# Patient Record
Sex: FEMALE | Race: WHITE | Employment: UNEMPLOYED | URBAN - METROPOLITAN AREA
[De-identification: names, ages, dates, MRNs, and addresses within clinical notes are randomized per-mention and may not be internally consistent; named-entity substitution may affect disease eponyms.]

---

## 2017-03-02 ENCOUNTER — APPOINTMENT (OUTPATIENT)
Dept: GENERAL RADIOLOGY | Age: 52
End: 2017-03-02
Attending: FAMILY MEDICINE
Payer: COMMERCIAL

## 2017-03-02 ENCOUNTER — HOSPITAL ENCOUNTER (OUTPATIENT)
Age: 52
Discharge: HOME | End: 2017-03-02
Attending: FAMILY MEDICINE
Payer: COMMERCIAL

## 2017-03-02 VITALS
WEIGHT: 255 LBS | SYSTOLIC BLOOD PRESSURE: 146 MMHG | OXYGEN SATURATION: 100 % | DIASTOLIC BLOOD PRESSURE: 93 MMHG | RESPIRATION RATE: 18 BRPM | HEIGHT: 66 IN | TEMPERATURE: 98 F | HEART RATE: 76 BPM | BODY MASS INDEX: 40.98 KG/M2

## 2017-03-02 DIAGNOSIS — M72.2 PLANTAR FASCIITIS OF LEFT FOOT: Primary | ICD-10-CM

## 2017-03-02 PROCEDURE — 99213 OFFICE O/P EST LOW 20 MIN: CPT

## 2017-03-02 PROCEDURE — 73630 X-RAY EXAM OF FOOT: CPT

## 2017-03-02 NOTE — ED INITIAL ASSESSMENT (HPI)
The patient is here with complaints of a possible broken foot. She states last night she was helping her  put up a curtain ana when she stood on her tip toes and both heard and felt a pop to the bottom of the left foot, below the metatarsals.   She

## 2017-03-02 NOTE — ED PROVIDER NOTES
Patient Seen in: [facility]    History   Patient presents with:  Lower Extremity Injury (musculoskeletal)    Stated Complaint: possible broken left foot x1 day , foot pain    HPI    Patient is a 61-year-old female.   Coming BID       Family History   Problem Relation Age of Onset   • Stroke Father    • Cancer Mother    • Cancer Maternal Grandmother    • Stroke Paternal Grandmother    • Heart Disease Neg          Smoking Status: Current Every Day Smoker        Packs/Day: 0.00 along the mid bottom of the foot along the second metatarsal.  There is no obvious swelling, erythema or ecchymosis noted. No crepitus noted to the area. Full rotation of movement at the ankle and all toes.   Sensations normal, capillary refill less than

## 2017-04-19 ENCOUNTER — OFFICE VISIT (OUTPATIENT)
Dept: FAMILY MEDICINE CLINIC | Facility: CLINIC | Age: 52
End: 2017-04-19

## 2017-04-19 VITALS
BODY MASS INDEX: 41.15 KG/M2 | SYSTOLIC BLOOD PRESSURE: 132 MMHG | HEART RATE: 69 BPM | DIASTOLIC BLOOD PRESSURE: 80 MMHG | WEIGHT: 250 LBS | OXYGEN SATURATION: 99 % | TEMPERATURE: 98 F | HEIGHT: 65.5 IN

## 2017-04-19 DIAGNOSIS — J01.00 ACUTE NON-RECURRENT MAXILLARY SINUSITIS: Primary | ICD-10-CM

## 2017-04-19 PROCEDURE — 99213 OFFICE O/P EST LOW 20 MIN: CPT | Performed by: FAMILY MEDICINE

## 2017-04-19 RX ORDER — ERGOCALCIFEROL 1.25 MG/1
CAPSULE ORAL
Refills: 2 | COMMUNITY
Start: 2017-04-19

## 2017-04-19 RX ORDER — FLUTICASONE PROPIONATE 50 MCG
SPRAY, SUSPENSION (ML) NASAL
Refills: 11 | COMMUNITY
Start: 2017-02-16 | End: 2018-12-24

## 2017-04-19 RX ORDER — AMOXICILLIN AND CLAVULANATE POTASSIUM 875; 125 MG/1; MG/1
1 TABLET, FILM COATED ORAL 2 TIMES DAILY
Qty: 20 TABLET | Refills: 0 | Status: SHIPPED | OUTPATIENT
Start: 2017-04-19 | End: 2017-04-29

## 2017-04-19 NOTE — PATIENT INSTRUCTIONS
Take antibiotics with food and plenty of water. Eat yogurt or take probiotic daily. (Seymour Moffett is a good example of an OTC probiotic)  Make sure to finish the entire antibiotic treatment.   Increase fluids and rest.   Use otc meds as needed-- consider re-star

## 2017-04-20 NOTE — PROGRESS NOTES
CHIEF COMPLAINT:   Patient presents with:  Sinus Problem: x 3 days. yellow/green d/c. facial pain. chills this am. has bad allergies-- for last few weeks. then sx worsened. can't breathe through her nose at night. using saline nasal sprays.        HPI:   Kim Morris TABS 1 TAB BID Disp: 60 Rfl: 0      Past Medical History   Diagnosis Date   • Anxiety state, unspecified    • Depression    • Back pain    • Esophageal reflux       History reviewed. No pertinent past surgical history.    Family History   Problem Relation A Visit:  Signed Prescriptions Disp Refills    Amoxicillin-Pot Clavulanate 875-125 MG Oral Tab 20 tablet 0      Sig: Take 1 tablet by mouth 2 (two) times daily. Risks, benefits, side effects of medication addressed and explained.     Patient Ins

## 2017-07-10 ENCOUNTER — OFFICE VISIT (OUTPATIENT)
Dept: FAMILY MEDICINE CLINIC | Facility: CLINIC | Age: 52
End: 2017-07-10

## 2017-07-10 VITALS
SYSTOLIC BLOOD PRESSURE: 142 MMHG | WEIGHT: 250 LBS | TEMPERATURE: 98 F | HEART RATE: 83 BPM | OXYGEN SATURATION: 98 % | BODY MASS INDEX: 41 KG/M2 | DIASTOLIC BLOOD PRESSURE: 82 MMHG

## 2017-07-10 DIAGNOSIS — H00.015 HORDEOLUM EXTERNUM OF LEFT LOWER EYELID: Primary | ICD-10-CM

## 2017-07-10 PROCEDURE — 99213 OFFICE O/P EST LOW 20 MIN: CPT | Performed by: FAMILY MEDICINE

## 2017-07-10 RX ORDER — AMOXICILLIN AND CLAVULANATE POTASSIUM 875; 125 MG/1; MG/1
TABLET, FILM COATED ORAL
Refills: 0 | COMMUNITY
Start: 2017-05-26 | End: 2017-06-02

## 2017-07-10 RX ORDER — TOBRAMYCIN 3 MG/ML
2 SOLUTION/ DROPS OPHTHALMIC EVERY 4 HOURS
Qty: 5 ML | Refills: 0 | Status: SHIPPED | OUTPATIENT
Start: 2017-07-10 | End: 2017-07-17

## 2017-07-10 RX ORDER — AZELASTINE HCL 205.5 UG/1
SPRAY NASAL
Refills: 12 | COMMUNITY
Start: 2017-05-02 | End: 2018-12-24

## 2017-07-11 NOTE — PATIENT INSTRUCTIONS
Use tobrex drops-- 2 drops in affected eye every 4 hours for 5-7 days. Continue warm compresses to eye. If no better in 2-3 days, follow-up with optometry or ophthalmology.

## 2017-07-11 NOTE — PROGRESS NOTES
CHIEF COMPLAINT:   Patient presents with:  Eye Problem: stye in left eye. painful. slowly getting worse. HPI:   Mayda Tucker is a 50year old female who presents with chief complaint of stye in left eye. Symptoms began  3  days ago.  Symptoms ha reviewed. No pertinent surgical history.    Family History   Problem Relation Age of Onset   • Stroke Father    • Cancer Mother    • Cancer Maternal Grandmother    • Stroke Paternal Grandmother    • Heart Disease Neg       Smoking status: Current Every Day optometry or ophthalmology.

## 2017-08-09 ENCOUNTER — OFFICE VISIT (OUTPATIENT)
Dept: FAMILY MEDICINE CLINIC | Facility: CLINIC | Age: 52
End: 2017-08-09

## 2017-08-09 VITALS
HEART RATE: 84 BPM | SYSTOLIC BLOOD PRESSURE: 128 MMHG | OXYGEN SATURATION: 96 % | DIASTOLIC BLOOD PRESSURE: 78 MMHG | WEIGHT: 155 LBS | HEIGHT: 65.5 IN | RESPIRATION RATE: 18 BRPM | BODY MASS INDEX: 25.52 KG/M2 | TEMPERATURE: 98 F

## 2017-08-09 DIAGNOSIS — R35.0 FREQUENCY OF URINATION: ICD-10-CM

## 2017-08-09 DIAGNOSIS — N30.00 ACUTE CYSTITIS WITHOUT HEMATURIA: Primary | ICD-10-CM

## 2017-08-09 LAB
APPEARANCE: CLEAR
MULTISTIX LOT#: NORMAL NUMERIC
PH, URINE: 6 (ref 4.5–8)
SPECIFIC GRAVITY: 1.02 (ref 1–1.03)
URINE-COLOR: YELLOW
UROBILINOGEN,SEMI-QN: 0.2 MG/DL (ref 0–1.9)

## 2017-08-09 PROCEDURE — 99213 OFFICE O/P EST LOW 20 MIN: CPT | Performed by: FAMILY MEDICINE

## 2017-08-09 PROCEDURE — 87086 URINE CULTURE/COLONY COUNT: CPT | Performed by: FAMILY MEDICINE

## 2017-08-09 PROCEDURE — 81003 URINALYSIS AUTO W/O SCOPE: CPT | Performed by: FAMILY MEDICINE

## 2017-08-09 RX ORDER — CIPROFLOXACIN 500 MG/1
500 TABLET, FILM COATED ORAL 2 TIMES DAILY
Qty: 10 TABLET | Refills: 0 | Status: SHIPPED | OUTPATIENT
Start: 2017-08-09 | End: 2017-08-14

## 2017-08-10 NOTE — PROGRESS NOTES
León Garcia is a 50year old female. HPI:   Patient presents with symptoms of UTI for 2-3 days. Complaining of urinary frequency, urgency, dysuria, mild suprapubic pain, low back pain, but no flank pain. Denies fever, hematuria.   Pt has history fever/chills or fatigue  SKIN: denies any unusual skin lesions or rashes  RESPIRATORY: no shortness of breath with exertion  CARDIOVASCULAR: denies chest pain on exertion and palpitations. GI: no nausea or vomiting  : as above.   NEURO: denies headaches finish the entire antibiotic treatment. We will send the urine specimen for culture and call you in 2-3 days with results  Increase fluid intake and bladder emptying. Return in 3 days if not better. Call if fever, vomiting, worsening symptoms.       The p

## 2017-08-10 NOTE — PATIENT INSTRUCTIONS
Take antibiotics with food and plenty of water. Eat yogurt daily or use probiotics. (Dunia Estrada is a good example of an OTC probiotic)  Make sure to finish the entire antibiotic treatment.   We will send the urine specimen for culture and call you in 2-3 days w

## 2017-08-12 NOTE — PROGRESS NOTES
Called pt on 8/11/ to notify of lab results-- put in lab book, but forgot to document in EPIC. Detailed msg left per pt consent.  Pt to call back or follow-up if symptoms increase or persist.

## 2017-11-06 ENCOUNTER — OFFICE VISIT (OUTPATIENT)
Dept: FAMILY MEDICINE CLINIC | Facility: CLINIC | Age: 52
End: 2017-11-06

## 2017-11-06 VITALS
HEIGHT: 66 IN | DIASTOLIC BLOOD PRESSURE: 88 MMHG | WEIGHT: 252.19 LBS | HEART RATE: 86 BPM | TEMPERATURE: 98 F | BODY MASS INDEX: 40.53 KG/M2 | RESPIRATION RATE: 18 BRPM | OXYGEN SATURATION: 99 % | SYSTOLIC BLOOD PRESSURE: 136 MMHG

## 2017-11-06 DIAGNOSIS — H00.011 HORDEOLUM EXTERNUM OF RIGHT UPPER EYELID: Primary | ICD-10-CM

## 2017-11-06 PROCEDURE — 99213 OFFICE O/P EST LOW 20 MIN: CPT | Performed by: FAMILY MEDICINE

## 2017-11-06 RX ORDER — TOBRAMYCIN 3 MG/ML
2 SOLUTION/ DROPS OPHTHALMIC EVERY 4 HOURS
Qty: 5 ML | Refills: 0 | Status: SHIPPED | OUTPATIENT
Start: 2017-11-06 | End: 2017-11-13

## 2017-11-06 NOTE — PATIENT INSTRUCTIONS
Use tobrex drops-- 2 drops in affected eye every 4 hours for 5-7 days. Use for 3 days past the first clear day. Warm compresses to area to help reduce swelling and discomfort.    If no better in 2-3 days or if symptoms worsen, follow-up for further evalua

## 2017-11-06 NOTE — PROGRESS NOTES
CHIEF COMPLAINT:   Patient presents with: Eye Visual Problem (opthalmic): stye on right eye x2 days (warm compresses used at home)      HPI:   Garrison Wilkins is a 50year old female who presents with chief complaint of right eye stye.   Symptoms began History reviewed. No pertinent surgical history.    Family History   Problem Relation Age of Onset   • Stroke Father    • Cancer Mother    • Cancer Maternal Grandmother    • Stroke Paternal Grandmother    • Heart Disease Neg       Smoking status: Current to help reduce swelling and discomfort. If no better in 2-3 days or if symptoms worsen, follow-up for further evaluation.

## 2017-11-08 ENCOUNTER — TELEPHONE (OUTPATIENT)
Dept: FAMILY MEDICINE CLINIC | Facility: CLINIC | Age: 52
End: 2017-11-08

## 2017-11-08 RX ORDER — AMOXICILLIN AND CLAVULANATE POTASSIUM 875; 125 MG/1; MG/1
1 TABLET, FILM COATED ORAL 2 TIMES DAILY
Qty: 14 TABLET | Refills: 0 | Status: SHIPPED | OUTPATIENT
Start: 2017-11-08 | End: 2017-11-15

## 2017-11-09 NOTE — TELEPHONE ENCOUNTER
Spoke to patient who was seen 2 days ago in [facility] [address] and prescribed Tobrex drops for stye to upper eyelid. Patient states that she poked it with a needle to relieve pressure and pus. Still having swelling and redness to area and tenderness.  Will start on Aug

## 2018-02-02 ENCOUNTER — OFFICE VISIT (OUTPATIENT)
Dept: FAMILY MEDICINE CLINIC | Facility: CLINIC | Age: 53
End: 2018-02-02

## 2018-02-02 VITALS
SYSTOLIC BLOOD PRESSURE: 138 MMHG | OXYGEN SATURATION: 96 % | WEIGHT: 248 LBS | HEART RATE: 79 BPM | TEMPERATURE: 98 F | BODY MASS INDEX: 39.86 KG/M2 | HEIGHT: 66 IN | DIASTOLIC BLOOD PRESSURE: 78 MMHG

## 2018-02-02 DIAGNOSIS — H00.015 HORDEOLUM EXTERNUM OF LEFT LOWER EYELID: Primary | ICD-10-CM

## 2018-02-02 PROCEDURE — 99213 OFFICE O/P EST LOW 20 MIN: CPT | Performed by: FAMILY MEDICINE

## 2018-02-02 RX ORDER — AMOXICILLIN AND CLAVULANATE POTASSIUM 875; 125 MG/1; MG/1
1 TABLET, FILM COATED ORAL 2 TIMES DAILY
Qty: 14 TABLET | Refills: 0 | Status: SHIPPED | OUTPATIENT
Start: 2018-02-02 | End: 2018-02-09

## 2018-02-02 NOTE — PATIENT INSTRUCTIONS
Take antibiotics with food and plenty of water. Eat yogurt or take probiotic daily. (Josiane Limon is a good example of an OTC probiotic)  Make sure to finish the entire antibiotic treatment. Continue warm compresses and tobrex drops until symptoms resolve.

## 2018-02-03 NOTE — PROGRESS NOTES
CHIEF COMPLAINT:   Patient presents with:   Eye Visual Problem (opthalmic): pt states she has a stye in left eye, c/o pain, lower eyelid, has swelling and redness in area x 3 dys       HPI:   Guzman Duque is a 50year old female who presents with chief 0   Cyclobenzaprine HCl (CYCLOBENZAPRINE) 10 MG Oral Tab Take 10 mg by mouth 3 (three) times daily as needed for Muscle spasms. Disp:  Rfl:      No current facility-administered medications for this visit.     Past Medical History:   Diagnosis Date   • Anxi diagnosis)    PLAN: Hygeine and comfort care as listen in patient instructions.   Medication as listed below       Signed Prescriptions Disp Refills    Amoxicillin-Pot Clavulanate 875-125 MG Oral Tab 14 tablet 0      Sig: Take 1 tablet by mouth 2 (two) time

## 2018-03-16 ENCOUNTER — HOSPITAL ENCOUNTER (OUTPATIENT)
Age: 53
Discharge: HOME | End: 2018-03-16
Attending: EMERGENCY MEDICINE
Payer: COMMERCIAL

## 2018-03-16 VITALS
OXYGEN SATURATION: 96 % | BODY MASS INDEX: 40 KG/M2 | DIASTOLIC BLOOD PRESSURE: 94 MMHG | RESPIRATION RATE: 22 BRPM | TEMPERATURE: 99 F | HEART RATE: 96 BPM | WEIGHT: 246.94 LBS | SYSTOLIC BLOOD PRESSURE: 145 MMHG

## 2018-03-16 DIAGNOSIS — J01.90 ACUTE NON-RECURRENT SINUSITIS, UNSPECIFIED LOCATION: Primary | ICD-10-CM

## 2018-03-16 PROCEDURE — 99214 OFFICE O/P EST MOD 30 MIN: CPT

## 2018-03-16 PROCEDURE — 99213 OFFICE O/P EST LOW 20 MIN: CPT

## 2018-03-16 RX ORDER — AMOXICILLIN AND CLAVULANATE POTASSIUM 875; 125 MG/1; MG/1
1 TABLET, FILM COATED ORAL 2 TIMES DAILY
Qty: 20 TABLET | Refills: 0 | Status: SHIPPED | OUTPATIENT
Start: 2018-03-16 | End: 2018-03-26

## 2018-03-17 NOTE — ED PROVIDER NOTES
Patient Seen in: [facility]    History   Patient presents with:  Cough/URI    Stated Complaint: head cold x1 week    HPI    65-year-old female presents to the emergency department complaining of head congestion for 1 week w rashes or lesions. Extremities are atraumatic. Neuro exam: Awake and moving all 4 extremities well. Exam findings and treatment plan were discussed prior to discharge.     ED Course   Labs Reviewed - No data to display    ED Course as of Mar 16 2020  ---

## 2018-04-27 ENCOUNTER — TELEPHONE (OUTPATIENT)
Dept: FAMILY MEDICINE CLINIC | Facility: CLINIC | Age: 53
End: 2018-04-27

## 2018-04-27 NOTE — TELEPHONE ENCOUNTER
This is a new Pt. Pt was trying to make an appt. With Dr. Tammie Ashford, told her that Dr. Tammie Ashford does not take new Pts. She said her acupuncturist Alma Chandra referred her, and Dr. Tammie Ashford should be aware of her case.   Please advise if its ok to

## 2018-04-30 NOTE — TELEPHONE ENCOUNTER
Spoke with patient informed her Dr Adolph Kennedy is not excepting new patients at this time. Told her we do have Dr Juan José Feliz is taking new patients if she wanted to.

## 2018-06-21 ENCOUNTER — HOSPITAL ENCOUNTER (OUTPATIENT)
Dept: MRI IMAGING | Age: 53
Discharge: HOME | End: 2018-06-21
Attending: PHYSICAL MEDICINE & REHABILITATION
Payer: COMMERCIAL

## 2018-06-21 DIAGNOSIS — M54.50 LUMBAGO: ICD-10-CM

## 2018-06-21 PROCEDURE — 72148 MRI LUMBAR SPINE W/O DYE: CPT | Performed by: PHYSICAL MEDICINE & REHABILITATION

## 2018-12-24 ENCOUNTER — OFFICE VISIT (OUTPATIENT)
Dept: FAMILY MEDICINE CLINIC | Facility: CLINIC | Age: 53
End: 2018-12-24
Payer: COMMERCIAL

## 2018-12-24 VITALS
RESPIRATION RATE: 20 BRPM | SYSTOLIC BLOOD PRESSURE: 137 MMHG | WEIGHT: 250 LBS | HEART RATE: 78 BPM | OXYGEN SATURATION: 96 % | BODY MASS INDEX: 40 KG/M2 | DIASTOLIC BLOOD PRESSURE: 87 MMHG | TEMPERATURE: 99 F

## 2018-12-24 DIAGNOSIS — R35.0 FREQUENT URINATION: Primary | ICD-10-CM

## 2018-12-24 DIAGNOSIS — N30.00 ACUTE CYSTITIS WITHOUT HEMATURIA: ICD-10-CM

## 2018-12-24 PROCEDURE — 99213 OFFICE O/P EST LOW 20 MIN: CPT | Performed by: FAMILY MEDICINE

## 2018-12-24 RX ORDER — NITROFURANTOIN 25; 75 MG/1; MG/1
100 CAPSULE ORAL 2 TIMES DAILY
Qty: 14 CAPSULE | Refills: 0 | Status: SHIPPED | OUTPATIENT
Start: 2018-12-24 | End: 2018-12-31

## 2018-12-24 NOTE — PROGRESS NOTES
Yoly Conley is a 48year old female. HPI:   Patient presents with symptoms of UTI for 7 days. Complaining of urinary frequency, urgency, dysuria, + suprapubic pain. Denies back pain, fever, hematuria.   Pt has history of UTI in past.      Current headaches or dizziness.     EXAM:   /87 (BP Location: Right arm, Patient Position: Sitting, Cuff Size: large)   Pulse 78   Temp 98.8 °F (37.1 °C) (Oral)   Resp 20   Wt 250 lb   LMP 02/19/2014   SpO2 96%   BMI 40.35 kg/m²   GENERAL: well developed, wel indicates understanding of these issues and agrees to the plan.

## 2018-12-24 NOTE — PATIENT INSTRUCTIONS
Take antibiotics with food and plenty of water. Eat yogurt daily or use probiotics. (Aldo Andrzej is a good example of an OTC probiotic)  Make sure to finish the entire antibiotic treatment. Increase fluid intake and bladder emptying.   Return in 3 days if not

## 2018-12-26 ENCOUNTER — TELEPHONE (OUTPATIENT)
Dept: FAMILY MEDICINE CLINIC | Facility: CLINIC | Age: 53
End: 2018-12-26

## 2018-12-26 RX ORDER — AMOXICILLIN AND CLAVULANATE POTASSIUM 875; 125 MG/1; MG/1
1 TABLET, FILM COATED ORAL 2 TIMES DAILY
Qty: 20 TABLET | Refills: 0 | Status: SHIPPED | OUTPATIENT
Start: 2018-12-26 | End: 2019-01-05

## 2019-06-22 ENCOUNTER — OFFICE VISIT (OUTPATIENT)
Dept: FAMILY MEDICINE CLINIC | Facility: CLINIC | Age: 54
End: 2019-06-22
Payer: COMMERCIAL

## 2019-06-22 VITALS — TEMPERATURE: 99 F | OXYGEN SATURATION: 96 % | HEART RATE: 75 BPM

## 2019-06-22 DIAGNOSIS — N30.00 ACUTE CYSTITIS WITHOUT HEMATURIA: ICD-10-CM

## 2019-06-22 DIAGNOSIS — R39.9 URINARY SYMPTOM OR SIGN: Primary | ICD-10-CM

## 2019-06-22 PROCEDURE — 81003 URINALYSIS AUTO W/O SCOPE: CPT | Performed by: FAMILY MEDICINE

## 2019-06-22 PROCEDURE — 99213 OFFICE O/P EST LOW 20 MIN: CPT | Performed by: FAMILY MEDICINE

## 2019-06-22 PROCEDURE — 87086 URINE CULTURE/COLONY COUNT: CPT | Performed by: FAMILY MEDICINE

## 2019-06-22 RX ORDER — AMOXICILLIN AND CLAVULANATE POTASSIUM 875; 125 MG/1; MG/1
1 TABLET, FILM COATED ORAL 2 TIMES DAILY
Qty: 20 TABLET | Refills: 0 | Status: SHIPPED | OUTPATIENT
Start: 2019-06-22 | End: 2019-07-02

## 2019-06-22 NOTE — PATIENT INSTRUCTIONS
Take antibiotics with food and plenty of water. Eat yogurt daily or use probiotics. ([facility] is a good example of an OTC probiotic)  Make sure to finish the entire antibiotic treatment.   We will send the urine specimen for culture and call you in 2-3 days w

## 2019-06-22 NOTE — PROGRESS NOTES
Anali Spencer is a 48year old female. HPI:   Patient presents with symptoms of UTI for 7 days. Complaining of urinary frequency, urgency, dysuria, mild suprapubic pain, low back pain. Denies flank pain, fever, hematuria.   Pt reports frequent histo rashes  RESPIRATORY: no shortness of breath with exertion  CARDIOVASCULAR: denies chest pain on exertion and palpitations. GI: no nausea or vomiting  : as above. NEURO: denies headaches or dizziness.     EXAM:   Pulse 75   Temp 98.6 °F (37 °C) (Oral) antibiotic treatment. We will send the urine specimen for culture and call you in 2-3 days with results  You may take otc pyridium for urinary discomfort if needed. Increase fluid intake and bladder emptying. Return in 3 days if not better.  Call if fev

## 2019-10-17 ENCOUNTER — OFFICE VISIT (OUTPATIENT)
Dept: FAMILY MEDICINE CLINIC | Facility: CLINIC | Age: 54
End: 2019-10-17
Payer: COMMERCIAL

## 2019-10-17 VITALS
DIASTOLIC BLOOD PRESSURE: 80 MMHG | HEIGHT: 65.5 IN | OXYGEN SATURATION: 98 % | WEIGHT: 228 LBS | RESPIRATION RATE: 16 BRPM | SYSTOLIC BLOOD PRESSURE: 128 MMHG | HEART RATE: 88 BPM | TEMPERATURE: 99 F | BODY MASS INDEX: 37.53 KG/M2

## 2019-10-17 DIAGNOSIS — H00.011 HORDEOLUM EXTERNUM OF RIGHT UPPER EYELID: Primary | ICD-10-CM

## 2019-10-17 PROCEDURE — 99213 OFFICE O/P EST LOW 20 MIN: CPT | Performed by: PHYSICIAN ASSISTANT

## 2019-10-17 RX ORDER — TOBRAMYCIN 3 MG/ML
1 SOLUTION/ DROPS OPHTHALMIC EVERY 4 HOURS
Qty: 1 BOTTLE | Refills: 0 | Status: SHIPPED | OUTPATIENT
Start: 2019-10-17 | End: 2019-10-24

## 2019-10-17 NOTE — PROGRESS NOTES
CHIEF COMPLAINT:   Patient presents with:   Eye Visual Problem (opthalmic): stye in right eye x 3 days,       HPI:   Beatriz Donohue is a 48year old female who presents for complaints of stye on right lower eyelid Symptoms started 3 days ago and have per status: Current Every Day Smoker      Smokeless tobacco: Never Used    Alcohol use: Not on file    Drug use: Not on file        REVIEW OF SYSTEMS:   GENERAL: no fatigue  SKIN: no rashes or abnormal skin lesions  HEENT: See HPI.     LUNGS: denies shortness o Hygeine and comfort care as listed in patient instructions. Advised patient to avoid touching eyes. Stressed importance of good handwashing. Warm compresses to affected eye prn.  Patient states that he would prefer eye drops over ointment,as tobrex has wo

## 2019-10-17 NOTE — PATIENT INSTRUCTIONS
Sty (or Stye)  A sty is an infection of the oil gland of the eyelid. It may develop into a small pocket of pus (an abscess). This can cause pain, redness, and swelling.  In early stages, a sty is treated with antibiotic cream, eye drops, or a small towel © 8694-5160 [employer] [address]. [address] [facility], [facility] [place]. All rights reserved. This information is not intended as a substitute for professional medical care. Always follow your healthcare professional's instructions.

## 2022-05-01 ENCOUNTER — HOSPITAL ENCOUNTER (OUTPATIENT)
Age: 57
Discharge: HOME | End: 2022-05-01
Payer: COMMERCIAL

## 2022-05-01 VITALS
DIASTOLIC BLOOD PRESSURE: 89 MMHG | WEIGHT: 220 LBS | OXYGEN SATURATION: 97 % | RESPIRATION RATE: 16 BRPM | TEMPERATURE: 98 F | SYSTOLIC BLOOD PRESSURE: 139 MMHG | HEART RATE: 72 BPM | HEIGHT: 66 IN | BODY MASS INDEX: 35.36 KG/M2

## 2022-05-01 DIAGNOSIS — J01.41 ACUTE RECURRENT PANSINUSITIS: Primary | ICD-10-CM

## 2022-05-01 RX ORDER — AMOXICILLIN AND CLAVULANATE POTASSIUM 875; 125 MG/1; MG/1
1 TABLET, FILM COATED ORAL 2 TIMES DAILY
Qty: 20 TABLET | Refills: 0 | Status: SHIPPED | OUTPATIENT
Start: 2022-05-01 | End: 2022-05-11

## 2022-05-01 NOTE — ED INITIAL ASSESSMENT (HPI)
Patient reports feeling like she might have sinus infection, symptoms x 1 week. Reports congestion, sinus pressure, drainage. Denies fever. No cough. No sore throat.

## 2022-08-10 ENCOUNTER — HOSPITAL ENCOUNTER (OUTPATIENT)
Age: 57
Discharge: HOME | End: 2022-08-10
Payer: COMMERCIAL

## 2022-08-10 VITALS
SYSTOLIC BLOOD PRESSURE: 140 MMHG | DIASTOLIC BLOOD PRESSURE: 83 MMHG | WEIGHT: 240 LBS | HEART RATE: 68 BPM | OXYGEN SATURATION: 99 % | TEMPERATURE: 97 F | RESPIRATION RATE: 20 BRPM | BODY MASS INDEX: 38.57 KG/M2 | HEIGHT: 66 IN

## 2022-08-10 DIAGNOSIS — S39.012A STRAIN OF LUMBAR REGION, INITIAL ENCOUNTER: Primary | ICD-10-CM

## 2022-08-10 PROCEDURE — 99213 OFFICE O/P EST LOW 20 MIN: CPT | Performed by: NURSE PRACTITIONER

## 2022-08-10 RX ORDER — PREDNISONE 20 MG/1
40 TABLET ORAL DAILY
Qty: 10 TABLET | Refills: 0 | Status: SHIPPED | OUTPATIENT
Start: 2022-08-10 | End: 2022-08-15

## 2022-08-10 NOTE — ED INITIAL ASSESSMENT (HPI)
Pt c/o lower back pain since Thursday, saw Chiropractor 3x since Thursday for adjustments and today when Pt was at office her Chiropractor advised she come to [address] [place] [address] for steroids

## 2022-08-12 ENCOUNTER — HOSPITAL ENCOUNTER (EMERGENCY)
Facility: HOSPITAL | Age: 57
Discharge: HOME | End: 2022-08-12
Attending: EMERGENCY MEDICINE
Payer: COMMERCIAL

## 2022-08-12 ENCOUNTER — APPOINTMENT (OUTPATIENT)
Dept: GENERAL RADIOLOGY | Facility: HOSPITAL | Age: 57
End: 2022-08-12
Attending: EMERGENCY MEDICINE
Payer: COMMERCIAL

## 2022-08-12 VITALS
BODY MASS INDEX: 38.57 KG/M2 | TEMPERATURE: 98 F | OXYGEN SATURATION: 97 % | WEIGHT: 240 LBS | DIASTOLIC BLOOD PRESSURE: 62 MMHG | HEIGHT: 66 IN | HEART RATE: 72 BPM | SYSTOLIC BLOOD PRESSURE: 105 MMHG | RESPIRATION RATE: 16 BRPM

## 2022-08-12 DIAGNOSIS — S39.012A STRAIN OF LUMBAR REGION, INITIAL ENCOUNTER: Primary | ICD-10-CM

## 2022-08-12 PROCEDURE — 72110 X-RAY EXAM L-2 SPINE 4/>VWS: CPT | Performed by: EMERGENCY MEDICINE

## 2022-08-12 PROCEDURE — 99283 EMERGENCY DEPT VISIT LOW MDM: CPT

## 2022-08-12 NOTE — ED INITIAL ASSESSMENT (HPI)
Lower back pain since 1 week . History of back injury 1 week ago after she was catching the car door. patient is ambulatory with steady gait.

## 2022-09-12 ENCOUNTER — HOSPITAL ENCOUNTER (OUTPATIENT)
Dept: GENERAL RADIOLOGY | Facility: HOSPITAL | Age: 57
Discharge: HOME | End: 2022-09-12
Attending: FAMILY MEDICINE
Payer: COMMERCIAL

## 2022-09-12 DIAGNOSIS — R07.81 RIB PAIN: ICD-10-CM

## 2022-09-12 PROCEDURE — 71101 X-RAY EXAM UNILAT RIBS/CHEST: CPT | Performed by: FAMILY MEDICINE

## 2022-11-06 ENCOUNTER — HOSPITAL ENCOUNTER (EMERGENCY)
Facility: HOSPITAL | Age: 57
Discharge: HOME | End: 2022-11-06
Attending: EMERGENCY MEDICINE
Payer: COMMERCIAL

## 2022-11-06 ENCOUNTER — APPOINTMENT (OUTPATIENT)
Dept: GENERAL RADIOLOGY | Facility: HOSPITAL | Age: 57
End: 2022-11-06
Attending: EMERGENCY MEDICINE
Payer: COMMERCIAL

## 2022-11-06 ENCOUNTER — APPOINTMENT (OUTPATIENT)
Dept: ULTRASOUND IMAGING | Facility: HOSPITAL | Age: 57
End: 2022-11-06
Attending: EMERGENCY MEDICINE
Payer: COMMERCIAL

## 2022-11-06 VITALS
OXYGEN SATURATION: 99 % | HEART RATE: 66 BPM | WEIGHT: 220 LBS | BODY MASS INDEX: 35.36 KG/M2 | DIASTOLIC BLOOD PRESSURE: 85 MMHG | TEMPERATURE: 98 F | HEIGHT: 66.14 IN | RESPIRATION RATE: 20 BRPM | SYSTOLIC BLOOD PRESSURE: 162 MMHG

## 2022-11-06 DIAGNOSIS — K80.20 CALCULUS OF GALLBLADDER WITHOUT CHOLECYSTITIS WITHOUT OBSTRUCTION: Primary | ICD-10-CM

## 2022-11-06 LAB
ALBUMIN SERPL-MCNC: 3.8 G/DL (ref 3.4–5)
ALBUMIN/GLOB SERPL: 0.8 {RATIO} (ref 1–2)
ALP LIVER SERPL-CCNC: 96 U/L
ALT SERPL-CCNC: 40 U/L
ANION GAP SERPL CALC-SCNC: 8 MMOL/L (ref 0–18)
AST SERPL-CCNC: 17 U/L (ref 15–37)
BASOPHILS # BLD AUTO: 0.05 X10(3) UL (ref 0–0.2)
BASOPHILS NFR BLD AUTO: 0.4 %
BILIRUB SERPL-MCNC: 0.3 MG/DL (ref 0.1–2)
BILIRUB UR QL STRIP.AUTO: NEGATIVE
BUN BLD-MCNC: 11 MG/DL (ref 7–18)
CALCIUM BLD-MCNC: 9.5 MG/DL (ref 8.5–10.1)
CHLORIDE SERPL-SCNC: 103 MMOL/L (ref 98–112)
CLARITY UR REFRACT.AUTO: CLEAR
CO2 SERPL-SCNC: 25 MMOL/L (ref 21–32)
COLOR UR AUTO: YELLOW
CREAT BLD-MCNC: 0.77 MG/DL
EOSINOPHIL # BLD AUTO: 0.17 X10(3) UL (ref 0–0.7)
EOSINOPHIL NFR BLD AUTO: 1.5 %
ERYTHROCYTE [DISTWIDTH] IN BLOOD BY AUTOMATED COUNT: 15.3 %
GFR SERPLBLD BASED ON 1.73 SQ M-ARVRAT: 91 ML/MIN/1.73M2 (ref 60–?)
GLOBULIN PLAS-MCNC: 4.5 G/DL (ref 2.8–4.4)
GLUCOSE BLD-MCNC: 119 MG/DL (ref 70–99)
GLUCOSE UR STRIP.AUTO-MCNC: NEGATIVE MG/DL
HCT VFR BLD AUTO: 44.2 %
HGB BLD-MCNC: 14.2 G/DL
IMM GRANULOCYTES # BLD AUTO: 0.04 X10(3) UL (ref 0–1)
IMM GRANULOCYTES NFR BLD: 0.3 %
KETONES UR STRIP.AUTO-MCNC: NEGATIVE MG/DL
LIPASE SERPL-CCNC: 135 U/L (ref 73–393)
LYMPHOCYTES # BLD AUTO: 2.8 X10(3) UL (ref 1–4)
LYMPHOCYTES NFR BLD AUTO: 24.5 %
MCH RBC QN AUTO: 26.6 PG (ref 26–34)
MCHC RBC AUTO-ENTMCNC: 32.1 G/DL (ref 31–37)
MCV RBC AUTO: 82.8 FL
MONOCYTES # BLD AUTO: 1.02 X10(3) UL (ref 0.1–1)
MONOCYTES NFR BLD AUTO: 8.9 %
NEUTROPHILS # BLD AUTO: 7.37 X10 (3) UL (ref 1.5–7.7)
NEUTROPHILS # BLD AUTO: 7.37 X10(3) UL (ref 1.5–7.7)
NEUTROPHILS NFR BLD AUTO: 64.4 %
NITRITE UR QL STRIP.AUTO: NEGATIVE
OSMOLALITY SERPL CALC.SUM OF ELEC: 283 MOSM/KG (ref 275–295)
PH UR STRIP.AUTO: 8 [PH] (ref 5–8)
PLATELET # BLD AUTO: 448 10(3)UL (ref 150–450)
POTASSIUM SERPL-SCNC: 3.1 MMOL/L (ref 3.5–5.1)
PROT SERPL-MCNC: 8.3 G/DL (ref 6.4–8.2)
PROT UR STRIP.AUTO-MCNC: NEGATIVE MG/DL
RBC # BLD AUTO: 5.34 X10(6)UL
RBC UR QL AUTO: NEGATIVE
SARS-COV-2 RNA RESP QL NAA+PROBE: NOT DETECTED
SODIUM SERPL-SCNC: 136 MMOL/L (ref 136–145)
SP GR UR STRIP.AUTO: 1.01 (ref 1–1.03)
UROBILINOGEN UR STRIP.AUTO-MCNC: <2 MG/DL
WBC # BLD AUTO: 11.5 X10(3) UL (ref 4–11)

## 2022-11-06 PROCEDURE — 99284 EMERGENCY DEPT VISIT MOD MDM: CPT

## 2022-11-06 PROCEDURE — 76700 US EXAM ABDOM COMPLETE: CPT | Performed by: EMERGENCY MEDICINE

## 2022-11-06 PROCEDURE — 71045 X-RAY EXAM CHEST 1 VIEW: CPT | Performed by: EMERGENCY MEDICINE

## 2022-11-06 PROCEDURE — 87086 URINE CULTURE/COLONY COUNT: CPT | Performed by: EMERGENCY MEDICINE

## 2022-11-06 PROCEDURE — 81001 URINALYSIS AUTO W/SCOPE: CPT | Performed by: EMERGENCY MEDICINE

## 2022-11-06 PROCEDURE — 80053 COMPREHEN METABOLIC PANEL: CPT | Performed by: EMERGENCY MEDICINE

## 2022-11-06 PROCEDURE — 96361 HYDRATE IV INFUSION ADD-ON: CPT

## 2022-11-06 PROCEDURE — 85025 COMPLETE CBC W/AUTO DIFF WBC: CPT | Performed by: EMERGENCY MEDICINE

## 2022-11-06 PROCEDURE — 83690 ASSAY OF LIPASE: CPT | Performed by: EMERGENCY MEDICINE

## 2022-11-06 PROCEDURE — 96374 THER/PROPH/DIAG INJ IV PUSH: CPT

## 2022-11-06 RX ORDER — SODIUM CHLORIDE 9 MG/ML
INJECTION, SOLUTION INTRAVENOUS CONTINUOUS
Status: DISCONTINUED | OUTPATIENT
Start: 2022-11-06 | End: 2022-11-06

## 2022-11-06 RX ORDER — KETOROLAC TROMETHAMINE 30 MG/ML
30 INJECTION, SOLUTION INTRAMUSCULAR; INTRAVENOUS ONCE
Status: COMPLETED | OUTPATIENT
Start: 2022-11-06 | End: 2022-11-06

## 2022-11-06 RX ORDER — SODIUM CHLORIDE 9 MG/ML
125 INJECTION, SOLUTION INTRAVENOUS CONTINUOUS
Status: DISCONTINUED | OUTPATIENT
Start: 2022-11-06 | End: 2022-11-06

## 2022-11-06 NOTE — ED INITIAL ASSESSMENT (HPI)
Pt c/o of right upper back pain since Friday night. Pt denies recent injuries. Pt denies abdominal pain, flank pain, N/V/D.

## 2022-12-02 ENCOUNTER — OFFICE VISIT (OUTPATIENT)
Facility: LOCATION | Age: 57
End: 2022-12-02
Payer: COMMERCIAL

## 2022-12-02 VITALS — HEART RATE: 98 BPM | TEMPERATURE: 98 F

## 2022-12-02 DIAGNOSIS — K80.10 CALCULUS OF GALLBLADDER WITH CHRONIC CHOLECYSTITIS WITHOUT OBSTRUCTION: Primary | ICD-10-CM

## 2022-12-02 PROCEDURE — 99204 OFFICE O/P NEW MOD 45 MIN: CPT | Performed by: SURGERY

## 2022-12-05 ENCOUNTER — TELEPHONE (OUTPATIENT)
Facility: LOCATION | Age: 57
End: 2022-12-05

## 2022-12-05 NOTE — TELEPHONE ENCOUNTER
Pt wants to take norco and ibuprofen for her shoulder pain. She normally takes this for her back. States OTC meds do not work. Pt denies fever, chills, N & V. Pt to call if she has worsening of symptoms and ok to take this medication.

## 2022-12-05 NOTE — TELEPHONE ENCOUNTER
Pt is calling because she is in 6/10 pain in her right shoulder blade, no shortness of breath, and is wondering if she can take the norco and other medication that she spoke with the Dr about. She says when she came in to discuss the pain with Hanane, it was discovered that it was her gallbladder and she's wondering if she can continue taking the medication that she was before.    Please advise  Best callback number is [phone]

## 2022-12-21 RX ORDER — FEXOFENADINE HCL 180 MG/1
180 TABLET ORAL DAILY
COMMUNITY

## 2022-12-21 RX ORDER — RIBOFLAVIN (VITAMIN B2) 100 MG
500 TABLET ORAL 2 TIMES DAILY
COMMUNITY

## 2023-01-07 ENCOUNTER — ANESTHESIA EVENT (OUTPATIENT)
Dept: SURGERY | Facility: HOSPITAL | Age: 58
End: 2023-01-07
Payer: COMMERCIAL

## 2023-01-12 ENCOUNTER — TELEPHONE (OUTPATIENT)
Dept: PHYSICAL MEDICINE AND REHAB | Facility: CLINIC | Age: 58
End: 2023-01-12

## 2023-01-12 NOTE — TELEPHONE ENCOUNTER
Initiated authorization for Laparoscopic Cholecystectomy w/ Cholangiogram CPT 14738 to be done at [facility] with [facility]  Status: Approved valid 1/12/23-4/12/23    Notification or Prior Authorization is not required for the requested services    This [employer] plan does not currently require a prior authorization for these services. If you have general questions about the prior authorization requirements, please call us at [phone] or visit [facility] > Clinician Resources > Advance and Admission Notification Requirements. The number above acknowledges your notification. Please write this number down for future reference. Notification is not a guarantee of coverage or payment.     Decision ID #:[id number]

## 2023-01-16 ENCOUNTER — EKG ENCOUNTER (OUTPATIENT)
Dept: LAB | Facility: HOSPITAL | Age: 58
End: 2023-01-16
Attending: SURGERY
Payer: COMMERCIAL

## 2023-01-16 ENCOUNTER — LAB ENCOUNTER (OUTPATIENT)
Dept: LAB | Facility: HOSPITAL | Age: 58
End: 2023-01-16
Attending: SURGERY
Payer: COMMERCIAL

## 2023-01-16 DIAGNOSIS — Z20.822 ENCOUNTER FOR PREPROCEDURE SCREENING LABORATORY TESTING FOR COVID-19: ICD-10-CM

## 2023-01-16 DIAGNOSIS — Z01.812 ENCOUNTER FOR PREPROCEDURE SCREENING LABORATORY TESTING FOR COVID-19: ICD-10-CM

## 2023-01-16 DIAGNOSIS — K80.20 CALCULUS OF GALLBLADDER: ICD-10-CM

## 2023-01-16 LAB
ATRIAL RATE: 63 BPM
P AXIS: 41 DEGREES
P-R INTERVAL: 146 MS
Q-T INTERVAL: 404 MS
QRS DURATION: 86 MS
QTC CALCULATION (BEZET): 413 MS
R AXIS: 32 DEGREES
T AXIS: 43 DEGREES
VENTRICULAR RATE: 63 BPM

## 2023-01-16 PROCEDURE — 93010 ELECTROCARDIOGRAM REPORT: CPT | Performed by: INTERNAL MEDICINE

## 2023-01-16 PROCEDURE — 93005 ELECTROCARDIOGRAM TRACING: CPT

## 2023-01-17 LAB — SARS-COV-2 RNA RESP QL NAA+PROBE: NOT DETECTED

## 2023-01-19 ENCOUNTER — HOSPITAL ENCOUNTER (OUTPATIENT)
Facility: HOSPITAL | Age: 58
Setting detail: HOSPITAL OUTPATIENT SURGERY
Discharge: HOME | End: 2023-01-19
Attending: SURGERY | Admitting: SURGERY
Payer: COMMERCIAL

## 2023-01-19 ENCOUNTER — ANESTHESIA (OUTPATIENT)
Dept: SURGERY | Facility: HOSPITAL | Age: 58
End: 2023-01-19
Payer: COMMERCIAL

## 2023-01-19 ENCOUNTER — APPOINTMENT (OUTPATIENT)
Dept: GENERAL RADIOLOGY | Facility: HOSPITAL | Age: 58
End: 2023-01-19
Attending: SURGERY
Payer: COMMERCIAL

## 2023-01-19 VITALS
WEIGHT: 211 LBS | OXYGEN SATURATION: 96 % | HEIGHT: 65 IN | SYSTOLIC BLOOD PRESSURE: 150 MMHG | RESPIRATION RATE: 20 BRPM | HEART RATE: 73 BPM | DIASTOLIC BLOOD PRESSURE: 88 MMHG | BODY MASS INDEX: 35.16 KG/M2 | TEMPERATURE: 98 F

## 2023-01-19 DIAGNOSIS — K80.10 CALCULUS OF GALLBLADDER WITH CHOLECYSTITIS WITHOUT BILIARY OBSTRUCTION, UNSPECIFIED CHOLECYSTITIS ACUITY: ICD-10-CM

## 2023-01-19 DIAGNOSIS — K80.20 CALCULUS OF GALLBLADDER: Primary | ICD-10-CM

## 2023-01-19 DIAGNOSIS — Z01.812 ENCOUNTER FOR PREPROCEDURE SCREENING LABORATORY TESTING FOR COVID-19: ICD-10-CM

## 2023-01-19 DIAGNOSIS — Z20.822 ENCOUNTER FOR PREPROCEDURE SCREENING LABORATORY TESTING FOR COVID-19: ICD-10-CM

## 2023-01-19 LAB
CHLORIDE SERPL-SCNC: 105 MMOL/L (ref 98–112)
CO2 SERPL-SCNC: 25 MMOL/L (ref 21–32)
POTASSIUM SERPL-SCNC: 3.3 MMOL/L (ref 3.5–5.1)
SODIUM SERPL-SCNC: 138 MMOL/L (ref 136–145)

## 2023-01-19 PROCEDURE — 74300 X-RAY BILE DUCTS/PANCREAS: CPT | Performed by: SURGERY

## 2023-01-19 PROCEDURE — 80051 ELECTROLYTE PANEL: CPT | Performed by: ANESTHESIOLOGY

## 2023-01-19 PROCEDURE — 88304 TISSUE EXAM BY PATHOLOGIST: CPT | Performed by: SURGERY

## 2023-01-19 PROCEDURE — BF101ZZ FLUOROSCOPY OF BILE DUCTS USING LOW OSMOLAR CONTRAST: ICD-10-PCS | Performed by: SURGERY

## 2023-01-19 PROCEDURE — 0FT44ZZ RESECTION OF GALLBLADDER, PERCUTANEOUS ENDOSCOPIC APPROACH: ICD-10-PCS | Performed by: SURGERY

## 2023-01-19 RX ORDER — HYDRALAZINE HYDROCHLORIDE 20 MG/ML
INJECTION INTRAMUSCULAR; INTRAVENOUS
Status: COMPLETED
Start: 2023-01-19 | End: 2023-01-19

## 2023-01-19 RX ORDER — HYDROMORPHONE HYDROCHLORIDE 1 MG/ML
0.2 INJECTION, SOLUTION INTRAMUSCULAR; INTRAVENOUS; SUBCUTANEOUS EVERY 5 MIN PRN
Status: DISCONTINUED | OUTPATIENT
Start: 2023-01-19 | End: 2023-01-19

## 2023-01-19 RX ORDER — ACETAMINOPHEN 500 MG
1000 TABLET ORAL ONCE AS NEEDED
Status: DISCONTINUED | OUTPATIENT
Start: 2023-01-19 | End: 2023-01-19

## 2023-01-19 RX ORDER — LIDOCAINE HYDROCHLORIDE 10 MG/ML
INJECTION, SOLUTION EPIDURAL; INFILTRATION; INTRACAUDAL; PERINEURAL AS NEEDED
Status: DISCONTINUED | OUTPATIENT
Start: 2023-01-19 | End: 2023-01-19 | Stop reason: SURG

## 2023-01-19 RX ORDER — DEXAMETHASONE SODIUM PHOSPHATE 4 MG/ML
VIAL (ML) INJECTION AS NEEDED
Status: DISCONTINUED | OUTPATIENT
Start: 2023-01-19 | End: 2023-01-19 | Stop reason: SURG

## 2023-01-19 RX ORDER — HYDROCODONE BITARTRATE AND ACETAMINOPHEN 5; 325 MG/1; MG/1
2 TABLET ORAL ONCE AS NEEDED
Status: DISCONTINUED | OUTPATIENT
Start: 2023-01-19 | End: 2023-01-19

## 2023-01-19 RX ORDER — MIDAZOLAM HYDROCHLORIDE 1 MG/ML
1 INJECTION INTRAMUSCULAR; INTRAVENOUS EVERY 5 MIN PRN
Status: COMPLETED | OUTPATIENT
Start: 2023-01-19 | End: 2023-01-19

## 2023-01-19 RX ORDER — ACETAMINOPHEN 500 MG
1000 TABLET ORAL ONCE
Status: DISCONTINUED | OUTPATIENT
Start: 2023-01-19 | End: 2023-01-19 | Stop reason: HOSPADM

## 2023-01-19 RX ORDER — KETOROLAC TROMETHAMINE 30 MG/ML
INJECTION, SOLUTION INTRAMUSCULAR; INTRAVENOUS AS NEEDED
Status: DISCONTINUED | OUTPATIENT
Start: 2023-01-19 | End: 2023-01-19 | Stop reason: SURG

## 2023-01-19 RX ORDER — HYDROCODONE BITARTRATE AND ACETAMINOPHEN 5; 325 MG/1; MG/1
1 TABLET ORAL ONCE AS NEEDED
Status: DISCONTINUED | OUTPATIENT
Start: 2023-01-19 | End: 2023-01-19

## 2023-01-19 RX ORDER — HYDROMORPHONE HYDROCHLORIDE 1 MG/ML
0.4 INJECTION, SOLUTION INTRAMUSCULAR; INTRAVENOUS; SUBCUTANEOUS EVERY 5 MIN PRN
Status: DISCONTINUED | OUTPATIENT
Start: 2023-01-19 | End: 2023-01-19

## 2023-01-19 RX ORDER — HYDRALAZINE HYDROCHLORIDE 20 MG/ML
10 INJECTION INTRAMUSCULAR; INTRAVENOUS ONCE
Status: COMPLETED | OUTPATIENT
Start: 2023-01-19 | End: 2023-01-19

## 2023-01-19 RX ORDER — HEPARIN SODIUM 5000 [USP'U]/ML
5000 INJECTION, SOLUTION INTRAVENOUS; SUBCUTANEOUS ONCE
Status: COMPLETED | OUTPATIENT
Start: 2023-01-19 | End: 2023-01-19

## 2023-01-19 RX ORDER — SODIUM CHLORIDE, SODIUM LACTATE, POTASSIUM CHLORIDE, CALCIUM CHLORIDE 600; 310; 30; 20 MG/100ML; MG/100ML; MG/100ML; MG/100ML
INJECTION, SOLUTION INTRAVENOUS CONTINUOUS
Status: DISCONTINUED | OUTPATIENT
Start: 2023-01-19 | End: 2023-01-19

## 2023-01-19 RX ORDER — PROCHLORPERAZINE EDISYLATE 5 MG/ML
5 INJECTION INTRAMUSCULAR; INTRAVENOUS EVERY 8 HOURS PRN
Status: DISCONTINUED | OUTPATIENT
Start: 2023-01-19 | End: 2023-01-19

## 2023-01-19 RX ORDER — MIDAZOLAM HYDROCHLORIDE 1 MG/ML
INJECTION INTRAMUSCULAR; INTRAVENOUS
Status: COMPLETED
Start: 2023-01-19 | End: 2023-01-19

## 2023-01-19 RX ORDER — BUPIVACAINE HYDROCHLORIDE AND EPINEPHRINE 5; 5 MG/ML; UG/ML
INJECTION, SOLUTION EPIDURAL; INTRACAUDAL; PERINEURAL AS NEEDED
Status: DISCONTINUED | OUTPATIENT
Start: 2023-01-19 | End: 2023-01-19 | Stop reason: HOSPADM

## 2023-01-19 RX ORDER — ONDANSETRON 2 MG/ML
INJECTION INTRAMUSCULAR; INTRAVENOUS AS NEEDED
Status: DISCONTINUED | OUTPATIENT
Start: 2023-01-19 | End: 2023-01-19 | Stop reason: SURG

## 2023-01-19 RX ORDER — NALOXONE HYDROCHLORIDE 0.4 MG/ML
80 INJECTION, SOLUTION INTRAMUSCULAR; INTRAVENOUS; SUBCUTANEOUS AS NEEDED
Status: DISCONTINUED | OUTPATIENT
Start: 2023-01-19 | End: 2023-01-19

## 2023-01-19 RX ORDER — ONDANSETRON 2 MG/ML
4 INJECTION INTRAMUSCULAR; INTRAVENOUS EVERY 6 HOURS PRN
Status: DISCONTINUED | OUTPATIENT
Start: 2023-01-19 | End: 2023-01-19

## 2023-01-19 RX ORDER — ROCURONIUM BROMIDE 10 MG/ML
INJECTION, SOLUTION INTRAVENOUS AS NEEDED
Status: DISCONTINUED | OUTPATIENT
Start: 2023-01-19 | End: 2023-01-19 | Stop reason: SURG

## 2023-01-19 RX ORDER — HYDROMORPHONE HYDROCHLORIDE 1 MG/ML
0.6 INJECTION, SOLUTION INTRAMUSCULAR; INTRAVENOUS; SUBCUTANEOUS EVERY 5 MIN PRN
Status: DISCONTINUED | OUTPATIENT
Start: 2023-01-19 | End: 2023-01-19

## 2023-01-19 RX ORDER — MIDAZOLAM HYDROCHLORIDE 1 MG/ML
INJECTION INTRAMUSCULAR; INTRAVENOUS AS NEEDED
Status: DISCONTINUED | OUTPATIENT
Start: 2023-01-19 | End: 2023-01-19 | Stop reason: SURG

## 2023-01-19 RX ADMIN — ONDANSETRON 4 MG: 2 INJECTION INTRAMUSCULAR; INTRAVENOUS at 16:40:00

## 2023-01-19 RX ADMIN — MIDAZOLAM HYDROCHLORIDE 4 MG: 1 INJECTION INTRAMUSCULAR; INTRAVENOUS at 15:42:00

## 2023-01-19 RX ADMIN — DEXAMETHASONE SODIUM PHOSPHATE 4 MG: 4 MG/ML VIAL (ML) INJECTION at 15:59:00

## 2023-01-19 RX ADMIN — KETOROLAC TROMETHAMINE 30 MG: 30 INJECTION, SOLUTION INTRAMUSCULAR; INTRAVENOUS at 16:22:00

## 2023-01-19 RX ADMIN — SODIUM CHLORIDE, SODIUM LACTATE, POTASSIUM CHLORIDE, CALCIUM CHLORIDE: 600; 310; 30; 20 INJECTION, SOLUTION INTRAVENOUS at 16:50:00

## 2023-01-19 RX ADMIN — SODIUM CHLORIDE, SODIUM LACTATE, POTASSIUM CHLORIDE, CALCIUM CHLORIDE: 600; 310; 30; 20 INJECTION, SOLUTION INTRAVENOUS at 15:45:00

## 2023-01-19 RX ADMIN — LIDOCAINE HYDROCHLORIDE 50 MG: 10 INJECTION, SOLUTION EPIDURAL; INFILTRATION; INTRACAUDAL; PERINEURAL at 15:45:00

## 2023-01-19 RX ADMIN — ROCURONIUM BROMIDE 50 MG: 10 INJECTION, SOLUTION INTRAVENOUS at 15:45:00

## 2023-01-19 NOTE — OPERATIVE REPORT
[facility]  Op Note    Abner Mcgee Location: [facility] [id number] MRN [id number]   Admission Date 1/19/2023 Operation Date 1/19/2023   Attending Physician Jeyson Sepulveda MD Operating Physician Orly Roland MD   DATE OF OPERATION:  1/19/2023   PREOPERATIVE DIAGNOSIS: Chronic cholecystitis with cholelithiasis  POSTOPERATIVE DIAGNOSIS: Chronic cholecystitis with cholelithiasis  PROCEDURE PERFORMED: Laparoscopic cholecystectomy with intraoperative cholangiogram.   SURGEON:  Orly Roland MD  ASSISTANT: Stephen Mooney MD (Her assistance was essential to the performance and conduct of this case, especially in the performance of the cholangiogram and the careful dissection needed in and around the triangle of Calot and gallbladder hilum.)  ANESTHESIA: General.   SPECIMEN: Gallbladder to pathology. BLOOD LOSS:  10 cc   COMPLICATIONS: None. INDICATIONS FOR PROCEDURE: The patient is a 81-year-old female who has been having postprandial right upper quadrant abdominal pain. Ultrasound revealed gallstones. She was offered laparoscopic cholecystectomy with intraoperative cholangiogram.  The risks, benefits, and alternatives were discussed in detail with the patient. Risks include but are not limited to bleeding, wound infection, right shoulder pain, injury to common bile duct, injury to the liver and injury to other intraabdominal contents. The patient was agreeable to proceed with the operation. FINDINGS: The patient had a normal cholangiogram, with good flow up to the liver and duodenum with no filling defects. The gallbladder was chronically inflamed, with lesions of the duodenum and omentum to the gallbladder itself. OPERATIVE TECHNIQUE: After informed consent was obtained, the patient was taken to the operating room and placed in the supine position. General anesthesia was induced. The abdomen was then prepped and draped in the usual sterile fashion.  The umbilicus was inverted, and a curvilinear incision was made superior to it with an 11-blade scalpel. The Veress needle was passed into the abdomen, and pneumoperitoneum was instituted without difficulty. The 11 mm trocar was passed through this incision site. The abdomen was inspected and found to be grossly normal. Under direct vision, a 5 mm subxiphoid port and two right-sided lateral 5 mm ports were placed. The patient was placed head-up, right side up. The gallbladder was grasped and retracted cranially. There were adhesions of the omentum and duodenum to the gallbladder itself. These were bluntly taken down. Dissection commenced around the cystic duct and cystic artery, which were isolated. Dissection was kept above the line of Rouviere. The critical view was obtained, demonstrating 2 structures directly entering the gallbladder. One clip was placed towards the gallbladder on the cystic duct, and the duct was partially transected. The cystic duct was cannulated. A cholangiogram was obtained showing good flow in the duodenum with no filling defects and good flow towards the liver. The cholangiocath was withdrawn, and three clips were placed opposite the partial transection. The duct was then completely transected. The cystic artery was isolated, one clip was placed towards the gallbladder, two away, and the artery was transected. The gallbladder was then dissected free from the liver bed using cautery. The gallbladder was then withdrawn through the umbilical port site after being placed in an Endo Catch bag. Inspection of the liver bed revealed some minor bleeding that was easily controlled with cautery. The abdomen was copiously irrigated with normal saline. Once hemostasis was assured and the irrigant returned as clear and colorless, the right-sided ports were withdrawn under direct vision. The pneumoperitoneum was aspirated, and the remaining ports were withdrawn.  The fascia at the umbilical port site was reapproximated with Maxon suture, and the skin at all four incisions was reapproximated with subcuticular Vicryl suture. 0.5% Marcaine with epinephrine was injected around the incisions to help with postoperative pain control. Steri-Strips and Mastisol were placed across the incisions. The patient tolerated the procedure well, was extubated in the OR, and went to the PACU in good condition.     Liliana Stein MD

## 2023-01-27 ENCOUNTER — TELEPHONE (OUTPATIENT)
Facility: LOCATION | Age: 58
End: 2023-01-27

## 2023-01-27 NOTE — TELEPHONE ENCOUNTER
Pt is in 7/10 pain in abdominal area, like gas pains. She is wondering if she can take chiquita-seltzer, or if there's anything she can do to alleviate it. When she was in the hospital they gave her a carbonated drink with a straw and she didn't understand. She's been drinking water as much as she can. Pt isn't really eating because of the pain.      Please advise  Best callback number is [phone]

## 2023-01-27 NOTE — TELEPHONE ENCOUNTER
Pt states she has have bad bloating/gas pains over the last few days. She is taking gas x with little relief and not eating a lot. She denies n/v/d, constipation, fever. Encouraged walking, water    Please advise.

## 2023-01-31 ENCOUNTER — OFFICE VISIT (OUTPATIENT)
Facility: LOCATION | Age: 58
End: 2023-01-31

## 2023-01-31 DIAGNOSIS — Z90.49 HISTORY OF LAPAROSCOPIC CHOLECYSTECTOMY: Primary | ICD-10-CM

## 2023-01-31 DIAGNOSIS — Z98.890 POST-OPERATIVE STATE: ICD-10-CM

## 2023-01-31 PROCEDURE — 99024 POSTOP FOLLOW-UP VISIT: CPT

## 2023-06-25 ENCOUNTER — HOSPITAL ENCOUNTER (OUTPATIENT)
Age: 58
Discharge: HOME | End: 2023-06-25
Payer: COMMERCIAL

## 2023-06-25 ENCOUNTER — APPOINTMENT (OUTPATIENT)
Dept: GENERAL RADIOLOGY | Age: 58
End: 2023-06-25
Attending: PHYSICIAN ASSISTANT
Payer: COMMERCIAL

## 2023-06-25 VITALS
OXYGEN SATURATION: 97 % | RESPIRATION RATE: 18 BRPM | HEART RATE: 72 BPM | TEMPERATURE: 98 F | SYSTOLIC BLOOD PRESSURE: 143 MMHG | DIASTOLIC BLOOD PRESSURE: 73 MMHG

## 2023-06-25 DIAGNOSIS — J40 BRONCHITIS: ICD-10-CM

## 2023-06-25 DIAGNOSIS — R05.1 ACUTE COUGH: Primary | ICD-10-CM

## 2023-06-25 LAB — SARS-COV-2 RNA RESP QL NAA+PROBE: NOT DETECTED

## 2023-06-25 PROCEDURE — U0002 COVID-19 LAB TEST NON-CDC: HCPCS | Performed by: PHYSICIAN ASSISTANT

## 2023-06-25 PROCEDURE — 71046 X-RAY EXAM CHEST 2 VIEWS: CPT | Performed by: PHYSICIAN ASSISTANT

## 2023-06-25 PROCEDURE — 99213 OFFICE O/P EST LOW 20 MIN: CPT | Performed by: PHYSICIAN ASSISTANT

## 2023-06-25 RX ORDER — CETIRIZINE HYDROCHLORIDE 10 MG/1
10 TABLET ORAL DAILY
Qty: 30 TABLET | Refills: 0 | Status: SHIPPED | OUTPATIENT
Start: 2023-06-25 | End: 2023-07-25

## 2023-06-25 RX ORDER — FLUTICASONE PROPIONATE 50 MCG
2 SPRAY, SUSPENSION (ML) NASAL DAILY
Qty: 16 G | Refills: 0 | Status: SHIPPED | OUTPATIENT
Start: 2023-06-25

## 2023-06-25 RX ORDER — FLUOXETINE HYDROCHLORIDE 40 MG/1
80 CAPSULE ORAL AS DIRECTED
COMMUNITY

## 2023-06-25 RX ORDER — TRAZODONE HYDROCHLORIDE 50 MG/1
TABLET ORAL NIGHTLY PRN
COMMUNITY
Start: 2023-01-16

## 2023-06-25 RX ORDER — OXYMETAZOLINE HYDROCHLORIDE 0.05 G/100ML
1 SPRAY NASAL 2 TIMES DAILY
Qty: 15 ML | Refills: 0 | Status: SHIPPED | OUTPATIENT
Start: 2023-06-25 | End: 2023-06-27

## 2023-06-25 RX ORDER — BENZONATATE 100 MG/1
100 CAPSULE ORAL 3 TIMES DAILY PRN
Qty: 30 CAPSULE | Refills: 0 | Status: SHIPPED | OUTPATIENT
Start: 2023-06-25 | End: 2023-07-25

## 2023-06-25 RX ORDER — ALBUTEROL SULFATE 90 UG/1
2 AEROSOL, METERED RESPIRATORY (INHALATION) EVERY 4 HOURS PRN
Qty: 1 EACH | Refills: 0 | Status: SHIPPED | OUTPATIENT
Start: 2023-06-25 | End: 2023-07-25

## 2023-06-25 RX ORDER — CETIRIZINE HYDROCHLORIDE 10 MG/1
10 TABLET ORAL DAILY
COMMUNITY

## 2023-06-25 NOTE — ED INITIAL ASSESSMENT (HPI)
Pt began 2 weeks ago with allergy type symptoms like sinus congestion, due to air quality.  And now for the past 2-3 days it has gone into her chest.  She coughing, no fever

## 2023-07-06 ENCOUNTER — APPOINTMENT (OUTPATIENT)
Dept: GENERAL RADIOLOGY | Age: 58
End: 2023-07-06
Attending: NURSE PRACTITIONER
Payer: COMMERCIAL

## 2023-07-06 ENCOUNTER — HOSPITAL ENCOUNTER (OUTPATIENT)
Age: 58
Discharge: HOME | End: 2023-07-06
Payer: COMMERCIAL

## 2023-07-06 VITALS
SYSTOLIC BLOOD PRESSURE: 130 MMHG | HEART RATE: 88 BPM | RESPIRATION RATE: 16 BRPM | TEMPERATURE: 98 F | OXYGEN SATURATION: 98 % | DIASTOLIC BLOOD PRESSURE: 73 MMHG

## 2023-07-06 DIAGNOSIS — R05.1 ACUTE COUGH: Primary | ICD-10-CM

## 2023-07-06 PROCEDURE — 71046 X-RAY EXAM CHEST 2 VIEWS: CPT | Performed by: NURSE PRACTITIONER

## 2023-07-06 PROCEDURE — 99213 OFFICE O/P EST LOW 20 MIN: CPT | Performed by: NURSE PRACTITIONER

## 2023-07-06 RX ORDER — PREDNISONE 20 MG/1
40 TABLET ORAL DAILY
Qty: 10 TABLET | Refills: 0 | Status: SHIPPED | OUTPATIENT
Start: 2023-07-06 | End: 2023-07-11

## 2023-07-06 NOTE — DISCHARGE INSTRUCTIONS
Take Prednisone 40mg once daily in the morning with breakfast for 5 days    Increase water intake to thin out mucus secretions   Mucinex in the morning  Continue using albuterol inhaler as needed    Humidifier in room at night    Please schedule an appointment to follow up with your primary care provider

## 2023-07-06 NOTE — ED INITIAL ASSESSMENT (HPI)
Was dx with bronchitis last week. Has spent all week in bed. No fevers, fatigued. Still coughing frequently. PCP added cough syrup. Hears rattling, but no able to expectorate.

## 2024-02-04 ENCOUNTER — HOSPITAL ENCOUNTER (OUTPATIENT)
Age: 59
Discharge: HOME | End: 2024-02-04
Payer: COMMERCIAL

## 2024-02-04 VITALS
TEMPERATURE: 98 F | BODY MASS INDEX: 33.75 KG/M2 | WEIGHT: 210 LBS | RESPIRATION RATE: 24 BRPM | HEART RATE: 72 BPM | DIASTOLIC BLOOD PRESSURE: 77 MMHG | SYSTOLIC BLOOD PRESSURE: 148 MMHG | OXYGEN SATURATION: 96 % | HEIGHT: 66 IN

## 2024-02-04 DIAGNOSIS — J01.90 ACUTE SINUSITIS, RECURRENCE NOT SPECIFIED, UNSPECIFIED LOCATION: Primary | ICD-10-CM

## 2024-02-04 DIAGNOSIS — R09.82 POSTNASAL DRIP: ICD-10-CM

## 2024-02-04 RX ORDER — AMOXICILLIN AND CLAVULANATE POTASSIUM 875; 125 MG/1; MG/1
1 TABLET, FILM COATED ORAL 2 TIMES DAILY
Qty: 10 TABLET | Refills: 0 | Status: SHIPPED | OUTPATIENT
Start: 2024-02-04 | End: 2024-02-09

## 2024-02-04 RX ORDER — FLUTICASONE PROPIONATE 50 MCG
2 SPRAY, SUSPENSION (ML) NASAL DAILY
Qty: 16 G | Refills: 0 | Status: SHIPPED | OUTPATIENT
Start: 2024-02-04 | End: 2024-03-05

## 2024-02-04 RX ORDER — PREDNISONE 20 MG/1
40 TABLET ORAL DAILY
Qty: 10 TABLET | Refills: 0 | Status: SHIPPED | OUTPATIENT
Start: 2024-02-04 | End: 2024-02-09

## 2024-02-04 NOTE — ED INITIAL ASSESSMENT (HPI)
Pt c/o 10 days of cough, nasal congestion and post nasal drip.  Pt denies fever. Pt had a sore throat that has resolved. Pt had a negative home covid test on Wednesday.

## 2024-02-04 NOTE — DISCHARGE INSTRUCTIONS
Sinusitis   Drink plenty of liquids.    May use a humidifier      Warm compresses to face.     May use nasal saline rinses or bianka pot.     Prednisone as directed with food.    Do not take Ibuprofen-like products (Motrin, Advil, Aleve, Naproxen) while taking prednisone     Flonase/Fluticasone (2sprays each nostril) once per day will help alleviate nasal pressure. You may also use saline nasal sprays throughout the day.     You may take tylenol, as needed, for any discomfort.     You may start the Augmentin antibiotic if your symptoms persist or worsen over the next few days.  Avoid vaping nicotine.  You may use your inhaler 2 puffs every 4-6 hours if needed for coughing or wheezing.  Rub Vicks vapor rub on your chest.  Drinking hot tea with honey may help to curtail your cough.  You may continue to take over-the-counter Mucinex to help thin your secretions.

## 2024-02-04 NOTE — ED PROVIDER NOTES
Patient Seen in: [facility]      History     Chief Complaint   Patient presents with    Cough/URI    Stuffy Nose    Post Nasal Drip     Stated Complaint: Congestion    Subjective:   HPI  Patient is a 58-year-old female with esophageal reflux, anxiety and depression presents with 10-day history of nasal congestion, postnasal drip, cough.  Patient states that she has lots of thick phlegm in the back of her throat will sometimes cause her to gag.  She is drinking fluids and taking Mucinex sinus medication.  She also takes Zyrtec.  She states that she does have allergies but seem to cause the symptoms periodically.  She has had bronchitis in the past and reports that her symptoms started in a similar fashion.  She does have an inhaler but has not used it recently.  She has taken steroids in the past with good results.  She does vape nicotine on a regular basis.  She has heard an occasional wheeze.  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  Denies recent travel.    Pharyngitis last week which has resolved.  Negative home COVID test on Wednesday.        Objective:   Past Medical History:   Diagnosis Date    Anxiety state, unspecified     Back pain     Degenerative disc disease, lumbar     Depression     Esophageal reflux     Lumbar herniated disc               Past Surgical History:   Procedure Laterality Date    LAPAROSCOPIC CHOLECYSTECTOMY  01/19/2023                Social History     Socioeconomic History    Marital status:    Tobacco Use    Smoking status: Former     Types: Cigarettes     Passive exposure: Never    Smokeless tobacco: Never   Vaping Use    Vaping Use: Every day    Substances: Nicotine    Devices: Disposable   Substance and Sexual Activity    Alcohol use: Never    Drug use: Yes     Frequency: 7.0 times per week     Types: Cannabis              Review of Systems    Positive for stated complaint: Congestion  Other systems are as noted in  HPI.  Constitutional and vital signs reviewed.      All other systems reviewed and negative except as noted above.    Physical Exam     ED Triage Vitals [02/04/24 1140]   /77   Pulse 72   Resp 24   Temp 97.8 °F (36.6 °C)   Temp src Temporal   SpO2 96 %   O2 Device None (Room air)       Current:/77   Pulse 72   Temp 97.8 °F (36.6 °C) (Temporal)   Resp 24   Ht 167.6 cm (5' 6\")   Wt 95.3 kg   LMP 02/19/2014   SpO2 96%   BMI 33.89 kg/m²         Physical Exam  Vitals and nursing note reviewed.   Constitutional:       General: She is not in acute distress.     Appearance: Normal appearance. She is well-developed. She is not ill-appearing, toxic-appearing or diaphoretic.   HENT:      Head:      Jaw: No trismus.      Right Ear: Tympanic membrane, ear canal and external ear normal.      Left Ear: Tympanic membrane, ear canal and external ear normal.      Nose: Mucosal edema present. No rhinorrhea.      Right Sinus: No maxillary sinus tenderness or frontal sinus tenderness.      Left Sinus: No maxillary sinus tenderness or frontal sinus tenderness.      Mouth/Throat:      Mouth: Mucous membranes are moist. No oral lesions.      Pharynx: Oropharynx is clear. Uvula midline. No pharyngeal swelling, oropharyngeal exudate, posterior oropharyngeal erythema or uvula swelling.   Eyes:      General:         Right eye: No discharge.         Left eye: No discharge.      Conjunctiva/sclera: Conjunctivae normal.   Cardiovascular:      Rate and Rhythm: Normal rate and regular rhythm.      Pulses: Normal pulses.      Heart sounds: Normal heart sounds. No murmur heard.     No friction rub. No gallop.   Pulmonary:      Effort: Pulmonary effort is normal. No respiratory distress.      Breath sounds: Normal breath sounds. No stridor. No wheezing, rhonchi or rales.   Chest:      Chest wall: No tenderness.   Abdominal:      General: There is no distension.      Tenderness: There is no abdominal tenderness.   Musculoskeletal:       Cervical back: Normal range of motion and neck supple.   Lymphadenopathy:      Cervical: No cervical adenopathy.   Skin:     General: Skin is warm and dry.      Capillary Refill: Capillary refill takes less than 2 seconds.      Findings: No rash.   Neurological:      Mental Status: She is alert and oriented to person, place, and time.   Psychiatric:         Mood and Affect: Mood normal.         Behavior: Behavior normal.                   ED Course   Labs Reviewed - No data to display                     MDM   58-year-old female presents with 10-day history of nasal congestion, postnasal drip, cough.  Sore throat last week which is resolved.  Negative home COVID test last week.  Patient is immunized against COVID.  Differential; viral illness, viral versus bacterial sinusitis, postnasal drip, bronchitis, pneumonia  Patient does have some mucosal edema and symptoms have been ongoing for 10 days.  I discussed with patient that this could be viral sinusitis and will treat with fluticasone nasal spray and steroids.  Written prescription for Augmentin provided in the event that her symptoms persist or worsen.  She may use her albuterol inhaler if needed.  Avoid vaping nicotine.  Otherwise supportive care.  Follow-up with PCP or seek immediate medical attention if symptoms worsen.  Patient is agreeable with this plan.                                     Medical Decision Making      Disposition and Plan     Clinical Impression:  1. Acute sinusitis, recurrence not specified, unspecified location    2. Postnasal drip         Disposition:  Discharge  2/4/2024 12:09 pm    Follow-up:  Rosie Sorto DO  [address] [address] [phone]      if symptoms persist or worsen          Medications Prescribed:  Current Discharge Medication List        START taking these medications    Details   !! fluticasone propionate 50 MCG/ACT Nasal Suspension 2 sprays by Nasal route daily.  Qty: 16 g,  Refills: 0      amoxicillin clavulanate 875-125 MG Oral Tab Take 1 tablet by mouth 2 (two) times daily for 5 days.  Qty: 10 tablet, Refills: 0      predniSONE 20 MG Oral Tab Take 2 tablets (40 mg total) by mouth daily for 5 days.  Qty: 10 tablet, Refills: 0       !! - Potential duplicate medications found. Please discuss with provider.

## 2025-05-14 ENCOUNTER — HOSPITAL ENCOUNTER (OUTPATIENT)
Age: 60
Discharge: HOME | End: 2025-05-14
Payer: COMMERCIAL

## 2025-05-14 VITALS
DIASTOLIC BLOOD PRESSURE: 87 MMHG | HEART RATE: 88 BPM | TEMPERATURE: 98 F | OXYGEN SATURATION: 97 % | SYSTOLIC BLOOD PRESSURE: 136 MMHG | RESPIRATION RATE: 20 BRPM

## 2025-05-14 DIAGNOSIS — N39.0 ACUTE URINARY TRACT INFECTION: Primary | ICD-10-CM

## 2025-05-14 LAB
BILIRUB UR QL STRIP: NEGATIVE
COLOR UR: YELLOW
GLUCOSE UR STRIP-MCNC: NEGATIVE MG/DL
KETONES UR STRIP-MCNC: NEGATIVE MG/DL
LEUKOCYTE ESTERASE UR QL STRIP: NEGATIVE
NITRITE UR QL STRIP: NEGATIVE
PH UR STRIP: 7 [PH]
PROT UR STRIP-MCNC: NEGATIVE MG/DL
SP GR UR STRIP: 1.02
UROBILINOGEN UR STRIP-ACNC: <2 MG/DL

## 2025-05-14 PROCEDURE — 99214 OFFICE O/P EST MOD 30 MIN: CPT | Performed by: PHYSICIAN ASSISTANT

## 2025-05-14 PROCEDURE — 81002 URINALYSIS NONAUTO W/O SCOPE: CPT | Performed by: PHYSICIAN ASSISTANT

## 2025-05-14 RX ORDER — AMOXICILLIN AND CLAVULANATE POTASSIUM 500; 125 MG/1; MG/1
1 TABLET, FILM COATED ORAL 2 TIMES DAILY
Qty: 14 TABLET | Refills: 0 | Status: SHIPPED | OUTPATIENT
Start: 2025-05-14 | End: 2025-05-21

## 2025-05-14 NOTE — DISCHARGE INSTRUCTIONS
Complete entire course of antibiotic as directed   Urine culture results in 1-2 days   Drink plenty of water and get plenty of rest   Follow up with your primary care provider     If you experience severe or worsening pain, fevers, chills, vomiting, flank pain, dizziness or weakness, or any other concerning symptoms, go to nearest ER immediately

## 2025-05-14 NOTE — ED INITIAL ASSESSMENT (HPI)
Pt c/o uti symptoms starting last week. Pt c/o urgency, cloudy urine and chills.  Pt also c/o low abdominal .

## 2025-05-14 NOTE — ED PROVIDER NOTES
Chief Complaint   Patient presents with    Urinary Symptoms    Abdominal Pain       History obtained from: patient   services not used     HPI:     Uyen Hart is a 60 year old female who presents with urinary symptoms x 1 week. Patient reports urinary frequency, urgency, cloudy urine, and sensation of incomplete voiding. Patient also reports suprapubic abdominal pain and chills x 4 days. Patient states symptoms feel the same as previous UTIs.  Denies recorded fever, flank pain, nausea, vomiting, hematuria, vaginal bleeding or discharge.    PMH  Past Medical History[1]    PFSH    PFS asessment screens reviewed and agree.  Nurses notes reviewed I agree with documentation.    Family History[2]  Family history reviewed with patient/caregiver and is not pertinent to presenting problem.  Social History     Socioeconomic History    Marital status:      Spouse name: Not on file    Number of children: Not on file    Years of education: Not on file    Highest education level: Not on file   Occupational History    Not on file   Tobacco Use    Smoking status: Former     Types: Cigarettes     Passive exposure: Never    Smokeless tobacco: Never   Vaping Use    Vaping status: Every Day    Substances: Nicotine    Devices: Disposable   Substance and Sexual Activity    Alcohol use: Never    Drug use: Yes     Frequency: 7.0 times per week     Types: Cannabis    Sexual activity: Not on file   Other Topics Concern    Caffeine Concern Not Asked    Exercise Not Asked    Seat Belt Not Asked    Special Diet Not Asked    Stress Concern Not Asked    Weight Concern Not Asked   Social History Narrative    Not on file     Social Drivers of Health     Food Insecurity: Not on file   Transportation Needs: Not on file   Housing Stability: Not on file         ROS:   Positive for stated complaint: Urinary frequency, urinary urgency, cloudy urine, incomplete voiding sensation, abdominal pain, chills  Other systems are as noted  in HPI.   All other systems reviewed and negative except as noted above.    Physical Exam:   Vital signs and nursing note reviewed.       /87   Pulse 88   Temp 98 °F (36.7 °C) (Oral)   Resp 20   LMP 02/19/2014   SpO2 97%     GENERAL: well developed, no acute distress, non-toxic appearing   SKIN: good skin turgor, no obvious rashes  HEAD: normocephalic, atraumatic  EYES: sclera non-icteric bilaterally, conjunctiva clear bilaterally  EARS: canals clear bilaterally, TMs clear bilaterally  NOSE: nasal turbinates pink, normal mucosa  OROPHARYNX: MMM, pharynx clear, no exudates or swelling, uvula midline, no tongue elevation, maintaining airway and secretions  NECK: supple, no lymphadenopathy, no nuchal rigidity, no trismus, no edema, phonation normal    CARDIO: RRR, normal heart sounds   LUNGS: clear to auscultation bilaterally, no increased WOB, no rales, rhonchi, or wheezes  GI: normoactive bowel sounds, abdomen soft, minimal suprapubic tenderness, no rebound tenderness or guarding, no CVA tenderness bilaterally  EXTREMITIES: no cyanosis or edema, REYNOLDS without difficulty  NEURO: no focal deficits  PSYCH: alert and oriented x3, answering questions appropriately, mood appropriate    MDM/Assessment/Plan:   Orders for this encounter:    Orders Placed This Encounter    POCT Urinalysis Dipstick    Urine Culture, Routine     Specimen source::   Urine, clean catch     Documentation of symptoms of UTI or sepsis::   Documentation of symptoms of UTI or sepsis must be corroborated within the EMR     Release to patient:   Immediate    amoxicillin clavulanate (AUGMENTIN) 500-125 MG Oral Tab     Sig: Take 1 tablet by mouth 2 (two) times a day for 7 days.     Dispense:  14 tablet     Refill:  0       Labs performed this visit:  Recent Results (from the past 10 hours)   POCT Urinalysis Dipstick    Collection Time: 05/14/25 11:44 AM   Result Value Ref Range    Urine Color Yellow Yellow    Urine Clarity Slightly cloudy (A)  Clear    Specific Gravity, Urine 1.020 1.005 - 1.030    PH, Urine 7.0 5.0 - 8.0    Protein urine Negative Negative mg/dL    Glucose, Urine Negative Negative mg/dL    Ketone, Urine Negative Negative mg/dL    Bilirubin, Urine Negative Negative    Blood, Urine Trace-lysed (A) Negative    Nitrite Urine Negative Negative    Urobilinogen urine <2.0 <2.0 mg/dL    Leukocyte esterase urine Negative Negative       Imaging performed this visit:  No orders to display       Medical Decision Making  DDx includes UTI versus interstitial cystitis versus nephrolithiasis versus other.  Patient is overall very well-appearing with stable vitals and tolerating oral intake.  No signs or symptoms of systemic illness.  No signs or symptoms of pyelonephritis.  Given patient's overall comfortable appearance and no flank pain, lower suspicion for acute nephrolithiasis.  Urine dipstick analysis reveals cloudy urine with trace blood. Urine culture pending.  Offered further evaluation of symptoms including labs and CT imaging of the abdomen/pelvis.  Patient declines further evaluation at this time and would prefer empiric treatment given her symptoms and history of UTIs with similar symptoms.  Rx Augmentin as patient has taken this in the past for UTIs and prefers this antibiotic. Discussed supportive care including rest, increased fluid intake, and OTC Tylenol/Motrin as needed for pain. Instructed patient to go directly to nearest ER with any worsening or concerning symptoms. Follow up with PCP.    Amount and/or Complexity of Data Reviewed  Labs: ordered.    Risk  OTC drugs.  Prescription drug management.          Diagnosis:    ICD-10-CM    1. Acute urinary tract infection  N39.0           All results reviewed and discussed with patient/patient's family. Patient/patient's family verbalize excellent understanding of instructions and feels comfortable with plan. All of patient's/patient's family's questions were addressed.   See AVS for detailed  discharge instructions for your condition today.    Follow Up with:  Rosie Sorto DO  [address] [id number]  [phone]    Schedule an appointment as soon as possible for a visit         Note: This document was dictated using Dragon medical dictation software.  Proofreading was performed to the best of my ability, but errors may be present.    Gris Jaimes PA-C         [1]   Past Medical History:   Anxiety state, unspecified    Back pain    Degenerative disc disease, lumbar    Depression    Esophageal reflux    Lumbar herniated disc   [2]   Family History  Problem Relation Age of Onset    Stroke Father     Cancer Mother     Cancer Maternal Grandmother     Stroke Paternal Grandmother     Heart Disease Neg

## 2025-05-22 ENCOUNTER — HOSPITAL ENCOUNTER (OUTPATIENT)
Dept: MAMMOGRAPHY | Age: 60
Discharge: HOME | End: 2025-05-22
Attending: FAMILY MEDICINE
Payer: COMMERCIAL

## 2025-05-22 DIAGNOSIS — Z12.31 SCREENING MAMMOGRAM, ENCOUNTER FOR: ICD-10-CM

## 2025-05-22 PROCEDURE — 77067 SCR MAMMO BI INCL CAD: CPT | Performed by: FAMILY MEDICINE

## 2025-05-22 PROCEDURE — 77063 BREAST TOMOSYNTHESIS BI: CPT | Performed by: FAMILY MEDICINE

## (undated) DEVICE — MONOFILAMENT ABSORBABLE SUTURE: Brand: MAXON

## (undated) DEVICE — DISPOSABLE LAPAROSCOPIC CLIP APPLIER WITH 20 CLIPS.: Brand: EPIX® UNIVERSAL CLIP APPLIER

## (undated) DEVICE — 40580 - THE PINK PAD - ADVANCED TRENDELENBURG POSITIONING KIT: Brand: 40580 - THE PINK PAD - ADVANCED TRENDELENBURG POSITIONING KIT

## (undated) DEVICE — LAPAROVUE VISIBILITY SYSTEM LAPAROSCOPIC SOLUTIONS: Brand: LAPAROVUE

## (undated) DEVICE — LIGHT HANDLE

## (undated) DEVICE — LAP CHOLE/APPY CDS-LF: Brand: MEDLINE INDUSTRIES, INC.

## (undated) DEVICE — SLEEVE KENDALL SCD EXPRESS MED

## (undated) DEVICE — TROCAR: Brand: KII SHIELDED BLADED ACCESS SYSTEM

## (undated) DEVICE — ENDOPATH 5MM CURVED SCISSORS WITH MONOPOLAR CAUTERY: Brand: ENDOPATH

## (undated) DEVICE — SOL NACL IRRIG 0.9% 1000ML BTL

## (undated) DEVICE — INSUFFLATION NEEDLE TO ESTABLISH PNEUMOPERITONEUM.: Brand: INSUFFLATION NEEDLE

## (undated) DEVICE — STERILE POLYISOPRENE POWDER-FREE SURGICAL GLOVES: Brand: PROTEXIS

## (undated) DEVICE — POUCH SPECIMEN WIRE 6X3 250ML

## (undated) DEVICE — TROCAR: Brand: KII® SLEEVE

## (undated) DEVICE — TIGERTAIL 5F FLXTIP 70CM

## (undated) DEVICE — ZIPWIRE GUIDE .038X150 STR/STF

## (undated) DEVICE — C-ARM: Brand: UNBRANDED

## (undated) DEVICE — APPLICATOR CHLORAPREP 26ML

## (undated) DEVICE — Device

## (undated) DEVICE — UNDYED BRAIDED (POLYGLACTIN 910), SYNTHETIC ABSORBABLE SUTURE: Brand: COATED VICRYL

## (undated) NOTE — LETTER
Date & Time: 7/6/2023, 12:14 PM  Patient: Alejandro Rich  Encounter Provider(s):    KADY Novak       To Whom It May Concern:    Bartolome Recio was seen and treated in our department on 7/6/2023. She should not return to work until 7/9/23 .     If you have any questions or concerns, please do not hesitate to call.        _____________________________  Physician/APC Signature

## (undated) NOTE — LETTER
[facility] [address]  [address] [address]  Dept: [phone]  Dept Fax: [phone]      March 2, 2017    Patient: Jazmine Ward   Date of Visit: 3/2/2017       To Whom It May Concern:    Zuleika Leiva was see

## (undated) NOTE — Clinical Note
I had the pleasure of seeing Sekou Valdezsoo on 12/2/2022. Please see my attached note.   Suleiman Hansen MD FACS [facility]

## (undated) NOTE — Clinical Note
04/19/2017    Patient Name:  Rah Bermudez        To Whom it may concern:    Rah Bermudez was evaluated in the office today and should be excused from attending work from 4/19 through 4/20.       Sincerely,     Linette Contreras PA-C

## (undated) NOTE — MR AVS SNAPSHOT
[facility]  [address] [facility] [address] [phone]               Thank you for choosing us for your health care visit with Vivek Devi PA-C.   We are glad to serve you and happy to provide you with this summary of y Commonly known as:  cyclobenzaprine           ergocalciferol 53313 units Caps   Commonly known as:  DRISDOL/VITAMIN D2           FLUoxetine HCl 40 MG Caps   Take 2 capsules by mouth 2 (two) times daily.    Commonly known as:  PROZAC           Fluticasone Pr Sheron.tn

## (undated) NOTE — LETTER
Date & Time: 8/10/2022, 6:52 PM  Patient: Lilia Amaro  Encounter Provider(s):    KADY Tinajero       To Whom It May Concern:    Augustin Man was seen and treated in our department on 8/10/2022. She may return to work Monday, Aug. 15, 2022.     If you have any questions or concerns, please do not hesitate to call.        _____________________________  Physician/APC Signature

## (undated) NOTE — ED AVS SNAPSHOT
[facility] [address] [id number]    Phone:  [phone]    Fax:  [phone]           Ms. Rk Roblero   MRN: [id number]    Department:  [facility]   Date of Vi at [phone]. Si usted tiene algun problema con serrano sequimiento, por favor llame a nuestro adminstrador de casos al [phone].     Expect to receive an electronic request (by e-mail or text) to complete a self-assessment the day after your visi [facility] [address] ([address]) [address]  ([address]) [address] [facility] ([address]) [address] [place] [address] 30. (8 TECHNIQUE:  AP, oblique, and lateral views were obtained. COMPARISON:  None. INDICATIONS:  possible broken left foot x1 day , foot pain     PATIENT STATED HISTORY:  Pt with left foot pain x1 day.  Pt states she was standing on her toes when the pain

## (undated) NOTE — LETTER
[facility]  [address] [address]  Dept: [phone]  Dept Fax: [phone]      March 2, 2017    Patient: Sofya Winston   Date of Visit: 3/2/2017       To Whom It May Concern:    Erica Coates was see